# Patient Record
Sex: FEMALE | Race: WHITE | NOT HISPANIC OR LATINO | URBAN - METROPOLITAN AREA
[De-identification: names, ages, dates, MRNs, and addresses within clinical notes are randomized per-mention and may not be internally consistent; named-entity substitution may affect disease eponyms.]

---

## 2021-09-08 NOTE — PATIENT DISCUSSION
Explained condition. Advised Pt to call immediately with any vision changes, increase in floaters, or if seeing flashes, dark shadows, or a curtain/veil across vision.

## 2021-10-12 ENCOUNTER — NEW PATIENT COMPREHENSIVE (OUTPATIENT)
Dept: URBAN - METROPOLITAN AREA CLINIC 53 | Facility: CLINIC | Age: 61
End: 2021-10-12

## 2021-10-12 DIAGNOSIS — H43.813: ICD-10-CM

## 2021-10-12 DIAGNOSIS — H26.493: ICD-10-CM

## 2021-10-12 PROCEDURE — 92004 COMPRE OPH EXAM NEW PT 1/>: CPT

## 2021-10-12 PROCEDURE — 92134 CPTRZ OPH DX IMG PST SGM RTA: CPT

## 2021-10-12 ASSESSMENT — VISUAL ACUITY
OS_SC: 20/25
OS_GLARE: 20/25
OD_SC: J2
OD_GLARE: 20/30
OU_SC: J1+
OD_SC: 20/150
OD_PH: 20/30-1
OS_GLARE: 20/40
OD_GLARE: 20/50
OS_SC: J5

## 2021-10-12 ASSESSMENT — KERATOMETRY
OD_K2POWER_DIOPTERS: 40.00
OS_AXISANGLE2_DEGREES: 52
OS_AXISANGLE_DEGREES: 142
OD_AXISANGLE_DEGREES: 16
OD_K1POWER_DIOPTERS: 39.25
OS_K2POWER_DIOPTERS: 39.75
OD_AXISANGLE2_DEGREES: 106
OS_K1POWER_DIOPTERS: 38.50

## 2021-10-12 ASSESSMENT — TONOMETRY
OS_IOP_MMHG: 13
OD_IOP_MMHG: 14

## 2021-10-12 NOTE — PATIENT DISCUSSION
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 Discussed the benefits of mono vision and being less dependent on reading glasses.

## 2021-10-12 NOTE — PATIENT DISCUSSION
Discussed with patient that the yag cap will help clear up the lens but will not correct the mono vision. Patient understands. Patient defers yag cap at this time. Will monitor at this time.

## 2021-10-12 NOTE — PATIENT DISCUSSION
PCO ([id number]): Visually significant PCO present on exam today. Recommend YAG laser capsulotomy to improve vision and decrease glare symptoms. RBAs of procedure discussed.

## 2022-09-12 NOTE — PATIENT DISCUSSION
Advised Pt to call immediately with any vision changes, increase in floaters, or if seeing flashes, dark shadows, or a curtain/veil across vision.